# Patient Record
Sex: MALE | URBAN - METROPOLITAN AREA
[De-identification: names, ages, dates, MRNs, and addresses within clinical notes are randomized per-mention and may not be internally consistent; named-entity substitution may affect disease eponyms.]

---

## 2019-07-24 ENCOUNTER — IP HISTORICAL/CONVERTED ENCOUNTER (OUTPATIENT)
Dept: OTHER | Age: 59
End: 2019-07-24

## 2019-10-18 ENCOUNTER — OP HISTORICAL/CONVERTED ENCOUNTER (OUTPATIENT)
Dept: OTHER | Age: 59
End: 2019-10-18

## 2021-05-20 ENCOUNTER — TELEPHONE (OUTPATIENT)
Dept: UROLOGY | Age: 61
End: 2021-05-20

## 2021-05-20 NOTE — TELEPHONE ENCOUNTER
Mrs. Derek Desai from [place] [address] asked could you fax over patient Prostate Biopsy notes from 2019 to fax number [phone]

## 2021-06-14 ENCOUNTER — TELEPHONE (OUTPATIENT)
Dept: UROLOGY | Age: 61
End: 2021-06-14

## 2021-06-14 NOTE — TELEPHONE ENCOUNTER
mother, Kashif Loveless call with concern that patient (son) has had a cath since 2019. states that patient has UTI. I told mother I would reach out to the facility to make appt. [address], [address] [facility]. Unable to contact facility.  busy signal each time trying to access the department